# Patient Record
Sex: MALE | Race: WHITE | NOT HISPANIC OR LATINO | Employment: FULL TIME | URBAN - METROPOLITAN AREA
[De-identification: names, ages, dates, MRNs, and addresses within clinical notes are randomized per-mention and may not be internally consistent; named-entity substitution may affect disease eponyms.]

---
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
## 2024-03-26 ENCOUNTER — OFFICE VISIT (OUTPATIENT)
Dept: FAMILY MEDICINE | Facility: CLINIC | Age: 19
End: 2024-03-26
Payer: COMMERCIAL

## 2024-03-26 ENCOUNTER — LAB (OUTPATIENT)
Dept: LAB | Facility: CLINIC | Age: 19
End: 2024-03-26
Payer: COMMERCIAL

## 2024-03-26 VITALS
HEIGHT: 69 IN | SYSTOLIC BLOOD PRESSURE: 130 MMHG | OXYGEN SATURATION: 95 % | BODY MASS INDEX: 33.29 KG/M2 | TEMPERATURE: 97.3 F | RESPIRATION RATE: 16 BRPM | HEART RATE: 62 BPM | DIASTOLIC BLOOD PRESSURE: 68 MMHG

## 2024-03-26 DIAGNOSIS — Z13.0 SCREENING FOR DEFICIENCY ANEMIA: ICD-10-CM

## 2024-03-26 DIAGNOSIS — Z13.220 SCREENING FOR HYPERLIPIDEMIA: ICD-10-CM

## 2024-03-26 DIAGNOSIS — Z13.1 SCREENING FOR DIABETES MELLITUS: ICD-10-CM

## 2024-03-26 DIAGNOSIS — L40.4 ACUTE GUTTATE PSORIASIS: Primary | ICD-10-CM

## 2024-03-26 DIAGNOSIS — Z11.59 NEED FOR HEPATITIS C SCREENING TEST: ICD-10-CM

## 2024-03-26 DIAGNOSIS — L40.4 ACUTE GUTTATE PSORIASIS: ICD-10-CM

## 2024-03-26 DIAGNOSIS — R06.81 WITNESSED EPISODE OF APNEA: ICD-10-CM

## 2024-03-26 DIAGNOSIS — Z11.4 SCREENING FOR HIV (HUMAN IMMUNODEFICIENCY VIRUS): Primary | ICD-10-CM

## 2024-03-26 LAB
ANION GAP SERPL CALCULATED.3IONS-SCNC: 11 MMOL/L (ref 7–15)
BUN SERPL-MCNC: 13.3 MG/DL (ref 6–20)
CALCIUM SERPL-MCNC: 9.5 MG/DL (ref 8.6–10)
CHLORIDE SERPL-SCNC: 105 MMOL/L (ref 98–107)
CHOLEST SERPL-MCNC: 210 MG/DL
CREAT SERPL-MCNC: 1.22 MG/DL (ref 0.67–1.17)
DEPRECATED HCO3 PLAS-SCNC: 26 MMOL/L (ref 22–29)
DEPRECATED S PYO AG THROAT QL EIA: NEGATIVE
EGFRCR SERPLBLD CKD-EPI 2021: 87 ML/MIN/1.73M2
ERYTHROCYTE [DISTWIDTH] IN BLOOD BY AUTOMATED COUNT: 12.3 % (ref 10–15)
FASTING STATUS PATIENT QL REPORTED: NO
GLUCOSE SERPL-MCNC: 99 MG/DL (ref 70–99)
GROUP A STREP BY PCR: NOT DETECTED
HCT VFR BLD AUTO: 48.2 % (ref 40–53)
HCV AB SERPL QL IA: NONREACTIVE
HDLC SERPL-MCNC: 46 MG/DL
HGB BLD-MCNC: 16 G/DL (ref 13.3–17.7)
HIV 1+2 AB+HIV1 P24 AG SERPL QL IA: NONREACTIVE
LDLC SERPL CALC-MCNC: 151 MG/DL
MCH RBC QN AUTO: 30.7 PG (ref 26.5–33)
MCHC RBC AUTO-ENTMCNC: 33.2 G/DL (ref 31.5–36.5)
MCV RBC AUTO: 93 FL (ref 78–100)
NONHDLC SERPL-MCNC: 164 MG/DL
PLATELET # BLD AUTO: 264 10E3/UL (ref 150–450)
POTASSIUM SERPL-SCNC: 4.4 MMOL/L (ref 3.4–5.3)
RBC # BLD AUTO: 5.21 10E6/UL (ref 4.4–5.9)
SODIUM SERPL-SCNC: 142 MMOL/L (ref 135–145)
TRIGL SERPL-MCNC: 66 MG/DL
WBC # BLD AUTO: 7.1 10E3/UL (ref 4–11)

## 2024-03-26 PROCEDURE — 99213 OFFICE O/P EST LOW 20 MIN: CPT | Mod: 25 | Performed by: NURSE PRACTITIONER

## 2024-03-26 PROCEDURE — 99000 SPECIMEN HANDLING OFFICE-LAB: CPT

## 2024-03-26 PROCEDURE — 80048 BASIC METABOLIC PNL TOTAL CA: CPT

## 2024-03-26 PROCEDURE — 86215 DEOXYRIBONUCLEASE ANTIBODY: CPT | Mod: 90

## 2024-03-26 PROCEDURE — 36415 COLL VENOUS BLD VENIPUNCTURE: CPT

## 2024-03-26 PROCEDURE — 80061 LIPID PANEL: CPT

## 2024-03-26 PROCEDURE — 90471 IMMUNIZATION ADMIN: CPT | Performed by: NURSE PRACTITIONER

## 2024-03-26 PROCEDURE — 85027 COMPLETE CBC AUTOMATED: CPT

## 2024-03-26 PROCEDURE — 87651 STREP A DNA AMP PROBE: CPT | Performed by: NURSE PRACTITIONER

## 2024-03-26 PROCEDURE — 87389 HIV-1 AG W/HIV-1&-2 AB AG IA: CPT

## 2024-03-26 PROCEDURE — 90651 9VHPV VACCINE 2/3 DOSE IM: CPT | Performed by: NURSE PRACTITIONER

## 2024-03-26 PROCEDURE — 86803 HEPATITIS C AB TEST: CPT

## 2024-03-26 ASSESSMENT — PAIN SCALES - GENERAL: PAINLEVEL: NO PAIN (0)

## 2024-03-26 NOTE — PROGRESS NOTES
Assessment & Plan     Acute guttate psoriasis  Will rule out possible strep, rapid is negative, pcr is pending. Referral placed to dermatology for further evaluation / management recommendations. Discussed disease process with patient. Labs ordered today.   - Adult Dermatology  Referral; Future  - Streptococcus A Rapid Screen w/Reflex to PCR - Clinic Collect  - Basic metabolic panel  (Ca, Cl, CO2, Creat, Gluc, K, Na, BUN); Future  - CBC with platelets; Future  - DNASE-B antibody    Witnessed episode of apnea  Referral placed for further evaluation/ management.   - Adult Sleep Eval & Management  Referral; Future    Screening for hyperlipidemia  - Lipid panel reflex to direct LDL Fasting; Future    Screening for deficiency anemia  - CBC with platelets; Future    Screening for diabetes mellitus  - Basic metabolic panel  (Ca, Cl, CO2, Creat, Gluc, K, Na, BUN); Future          Subjective   Paul is a 18 year old, presenting for the following health issues:  Derm Problem        3/26/2024     9:14 AM   Additional Questions   Roomed by Elvia CLEMENT MA     History of Present Illness       Reason for visit:  Bumps on body, scalp sores and heavy flaking.  Symptom onset:  More than a month  Symptoms include:  Bumps on body.  Symptom intensity:  Moderate  Symptom progression:  Worsening  Had these symptoms before:  No  What makes it worse:  No.  What makes it better:  .shower.    He eats 2-3 servings of fruits and vegetables daily.He consumes 1 sweetened beverage(s) daily.He exercises with enough effort to increase his heart rate 60 or more minutes per day.  He exercises with enough effort to increase his heart rate 6 days per week.   He is taking medications regularly.     *Has photos. Has been worsening-  Sores currently all over head, rash does not itch or have pain.   Red spots in patches all over body. Has been going on for about 3 months. Large patches in armpits, and groin. Sporadic red dots all over  "body.  Tried eczema cream on face spots only. Dry, patchy and raised.   Once in a while they will open and bleed.     Significant other mentions apnea episodes as well when sleeping. He does snore.          Review of Systems  Constitutional, neuro, ENT, endocrine, pulmonary, cardiac, gastrointestinal, genitourinary, musculoskeletal, integument and psychiatric systems are negative, except as otherwise noted.      Objective    /68   Pulse 62   Temp 97.3  F (36.3  C) (Tympanic)   Resp 16   Ht 1.753 m (5' 9\")   SpO2 95%   BMI 33.29 kg/m    Body mass index is 33.29 kg/m .    Physical Exam   GENERAL: alert and no distress  HENT: both ears: canals dry and flaking with rash present, oropharynx clear, oral mucous membranes moist, tonsillar hypertrophy, and tonsillar erythema  NECK: no adenopathy, no asymmetry, masses, or scars  RESP: lungs clear to auscultation - no rales, rhonchi or wheezes  CV: regular rate and rhythm, normal S1 S2, no S3 or S4, no murmur, click or rub, no peripheral edema   SKIN: papular erythematous lesions guttate in pattern all over body. See photos below. Confluent raised flaking rash on scalp. Confluent in flexeral areas of axilla and groin.     Results for orders placed or performed in visit on 03/26/24   Streptococcus A Rapid Screen w/Reflex to PCR - Clinic Collect     Status: Normal    Specimen: Throat; Swab   Result Value Ref Range    Group A Strep antigen Negative Negative                                   Signed Electronically by: SYDNIE Decker CNP    "

## 2024-03-28 LAB — STREP DNASE B SER-ACNC: 180 U/ML

## 2024-04-13 ENCOUNTER — HEALTH MAINTENANCE LETTER (OUTPATIENT)
Age: 19
End: 2024-04-13

## 2024-04-19 ENCOUNTER — OFFICE VISIT (OUTPATIENT)
Dept: DERMATOLOGY | Facility: CLINIC | Age: 19
End: 2024-04-19
Payer: COMMERCIAL

## 2024-04-19 DIAGNOSIS — L40.4 ACUTE GUTTATE PSORIASIS: ICD-10-CM

## 2024-04-19 PROCEDURE — 87070 CULTURE OTHR SPECIMN AEROBIC: CPT | Performed by: PHYSICIAN ASSISTANT

## 2024-04-19 PROCEDURE — 99204 OFFICE O/P NEW MOD 45 MIN: CPT | Performed by: PHYSICIAN ASSISTANT

## 2024-04-19 PROCEDURE — 99000 SPECIMEN HANDLING OFFICE-LAB: CPT | Performed by: PATHOLOGY

## 2024-04-19 RX ORDER — TRIAMCINOLONE ACETONIDE 0.25 MG/G
CREAM TOPICAL 2 TIMES DAILY
Qty: 80 G | Refills: 1 | Status: SHIPPED | OUTPATIENT
Start: 2024-04-19 | End: 2024-07-10

## 2024-04-19 RX ORDER — CLOBETASOL PROPIONATE 0.5 MG/ML
SOLUTION TOPICAL 2 TIMES DAILY
Qty: 50 ML | Refills: 2 | Status: SHIPPED | OUTPATIENT
Start: 2024-04-19 | End: 2024-05-13

## 2024-04-19 ASSESSMENT — PAIN SCALES - GENERAL: PAINLEVEL: NO PAIN (0)

## 2024-04-19 NOTE — PROGRESS NOTES
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 Fhx PSO in mother and sister. No joint pain.   - Edu was given. Treatment options were discussed as well as their side effects.   - discussed that summer may help to improve his rash as sunlight can help, he does work out side - edu on sun protection  - rapid Strep A screen and Group A strep by PCR performed by PCP on 3/26/24 both negative/undetected  - Bacterial culture was performed on scalp today - will treat if co infected  - Start on triamcinolone 0.25% cream, Apply topically 2 times daily to axillae and groin - steroid edu given  - Start on clobetasol solution 0.05%,  Apply topically 2 times daily to scalp - steroid edu given  Discussed using phototherapy(nbUVB) to treat skin condition. Discussed risks and benefits of treatment including but not limited to burn, dyspigmentation, and theoretical increased risk of skin malignancy. Phototherapy consent obtained in verbal and written format. Total skin examination completed today. Medication list reviewed and no photosensitizing medication was noted.   Will initiate treatment at  100mJ three times weekly for 4 weeks and recheck with provider in 4 weeks.   Maximum dose:  Type I, II = 2000 mJ/cm2    Procedures Performed:     Follow-up: 6 month(s) in-person, or earlier for new or changing lesions    Staff and scribe:    Scribe Disclosure:   I, SONG RICE, am serving as a scribe; to document services personally performed by Aby Wilson PA-C -based on data collection and the provider's statements to me.     Provider  Disclosure:  I agree with above History, Review of Systems, Physical exam and Plan.  I have reviewed the content of the documentation and have edited it as needed. I have personally performed the services documented here and the documentation accurately represents those services and the decisions I have made.      Electronically signed by:    All risks, benefits and alternatives were discussed with patient.  Patient is in agreement and understands the assessment and plan.  All questions were answered.    Aby Wilson PA-C, MPAS  [facility] [place]: Phone: [phone], Fax: [phone]  [facility]: Phone: [phone],  Fax: [phone]  [facility]: Phone: [phone], Fax: [phone]  ____________________________________________    CC: Derm Problem ( scabbing and wounds on head and body since December)      Reviewed patients past medical history and pertinent chart review prior to patient's visit today.     HPI:  Mr. John Terrell is a 18 year old male who presents today as a return patient for evaluation. Today patient reported scabbing and wounds on his head and body. Has had for about 5-6 months. Referred by PCP who thought this was guttate PSO. Patient has been picking at scalp. Mom and sister with PSO. No joint pain.      Patient is otherwise feeling well, without additional concerns.    Labs:  Labs performed on 3/26/24   Group A strep by PCR  Not Detected   Reviewed labs performed on 3/26/24: BMP, CBC, lipid ( Elevated)   DNAse B Antibody  <=259 U/mL 180      HIV Antigen Antibody Combo  Nonreactive Nonreactive      Hepatitis C Antibody  Nonreactive Nonreactive     Physical Exam:  Vitals: There were no vitals taken for this visit.  SKIN: Total skin excluding the undergarment areas was performed. The exam included the head/face, neck, both arms, chest, back, abdomen, both legs, digits and/or nails.     - Erythematous scaly papules ranges in size from 2 m - 1 cm on his torsos arms and legs.   - Diffused scaling and plaques throughout his scalp  - Scaly thickened plaques on the bilateral axilla  - Scaly erythematous plaques on the bilateral inguinal folds and scrotum  - No other lesions of concern on areas examined.     Medications:  Current Outpatient Medications   Medication Sig Dispense Refill    cetirizine (ZYRTEC) 10 MG tablet Take 10 mg by mouth daily (Patient not taking: Reported on 3/26/2024)      lidocaine (XYLOCAINE) 2 % external gel Place into the urethra 3 times daily as needed for moderate pain (Patient not taking: Reported on 10/12/2021) 30 mL 0    methylphenidate (CONCERTA) 36 MG CR tablet Take 1 tablet (36 mg) by mouth daily (Patient not taking: Reported on 3/26/2024) 30 tablet 0    methylphenidate (CONCERTA) 36 MG CR tablet Take 1 tablet (36 mg) by mouth daily (Patient not taking: Reported on 3/26/2024) 30 tablet 0    methylphenidate (CONCERTA) 36 MG CR tablet Take 1 tablet (36 mg) by mouth daily (Patient not taking: Reported on 3/26/2024) 30 tablet 0    TYLENOL CHILDRENS 80 MG OR CHEW prn (Patient not taking: No sig reported)  0     No current facility-administered medications for this visit.      Past Medical/Surgical History:   Patient Active Problem List   Diagnosis    Snoring    ADHD (attention deficit hyperactivity disorder)    Long-term current use of stimulant     History reviewed. No pertinent past medical history.

## 2024-04-19 NOTE — NURSING NOTE
Dermatology Rooming Note    John Terrell's goals for this visit include:   Chief Complaint   Patient presents with    Derm Problem      scabbing and wounds on head and body since December     Ashley Duval, EMT

## 2024-04-19 NOTE — LETTER
4/19/2024       RE: John Terrell  [address]  [place] [address]     Dear Colleague,    Thank you for referring your patient, John Terrell, to the [facility] [place] [facility]. Please see a copy of my visit note below.    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 Fhx PSO in mother and sister. No joint pain.   - Edu was given. Treatment options were discussed as well as their side effects.   - discussed that summer may help to improve his rash as sunlight can help, he does work out side - edu on sun protection  - rapid Strep A screen and Group A strep by PCR performed by PCP on 3/26/24 both negative/undetected  - Bacterial culture was performed on scalp today - will treat if co infected  - Start on triamcinolone 0.25% cream, Apply topically 2 times daily to axillae and groin - steroid edu given  - Start on clobetasol solution 0.05%,  Apply topically 2 times daily to scalp - steroid edu given  Discussed using phototherapy(nbUVB) to treat skin condition. Discussed risks and benefits of treatment including but not limited to burn, dyspigmentation, and theoretical increased risk of skin malignancy. Phototherapy consent obtained in verbal and written format. Total skin examination completed today. Medication list reviewed and no photosensitizing medication was noted.   Will initiate treatment at  100mJ three times weekly for 4 weeks and recheck with provider in 4 weeks.   Maximum dose:  Type I, II = 2000 mJ/cm2    Procedures Performed:      Follow-up: 6 month(s) in-person, or earlier for new or changing lesions    Staff and scribe:    Scribe Disclosure:   I, SONG RICE, am serving as a scribe; to document services personally performed by Aby Wilson PA-C -based on data collection and the provider's statements to me.     Provider Disclosure:  I agree with above History, Review of Systems, Physical exam and Plan.  I have reviewed the content of the documentation and have edited it as needed. I have personally performed the services documented here and the documentation accurately represents those services and the decisions I have made.      Electronically signed by:    All risks, benefits and alternatives were discussed with patient.  Patient is in agreement and understands the assessment and plan.  All questions were answered.    Aby Wilson PA-C, RUSTS  [facility] [place]: Phone: [phone], Fax: [phone]  [facility]: Phone: [phone],  Fax: [phone]  [facility]: Phone: [phone], Fax: [phone]  ____________________________________________    CC: Derm Problem ( scabbing and wounds on head and body since December)      Reviewed patients past medical history and pertinent chart review prior to patient's visit today.     HPI:  Mr. John Terrell is a 18 year old male who presents today as a return patient for evaluation. Today patient reported scabbing and wounds on his head and body. Has had for about 5-6 months. Referred by PCP who thought this was guttate PSO. Patient has been picking at scalp. Mom and sister with PSO. No joint pain.      Patient is otherwise feeling well, without additional concerns.    Labs:  Labs performed on 3/26/24   Group A strep by PCR  Not Detected   Reviewed labs performed on 3/26/24: BMP, CBC, lipid ( Elevated)   DNAse B Antibody  <=259 U/mL 180      HIV Antigen Antibody  Combo  Nonreactive Nonreactive      Hepatitis C Antibody  Nonreactive Nonreactive     Physical Exam:  Vitals: There were no vitals taken for this visit.  SKIN: Total skin excluding the undergarment areas was performed. The exam included the head/face, neck, both arms, chest, back, abdomen, both legs, digits and/or nails.    - Erythematous scaly papules ranges in size from 2 m - 1 cm on his torsos arms and legs.   - Diffused scaling and plaques throughout his scalp  - Scaly thickened plaques on the bilateral axilla  - Scaly erythematous plaques on the bilateral inguinal folds and scrotum  - No other lesions of concern on areas examined.     Medications:  Current Outpatient Medications   Medication Sig Dispense Refill    cetirizine (ZYRTEC) 10 MG tablet Take 10 mg by mouth daily (Patient not taking: Reported on 3/26/2024)      lidocaine (XYLOCAINE) 2 % external gel Place into the urethra 3 times daily as needed for moderate pain (Patient not taking: Reported on 10/12/2021) 30 mL 0    methylphenidate (CONCERTA) 36 MG CR tablet Take 1 tablet (36 mg) by mouth daily (Patient not taking: Reported on 3/26/2024) 30 tablet 0    methylphenidate (CONCERTA) 36 MG CR tablet Take 1 tablet (36 mg) by mouth daily (Patient not taking: Reported on 3/26/2024) 30 tablet 0    methylphenidate (CONCERTA) 36 MG CR tablet Take 1 tablet (36 mg) by mouth daily (Patient not taking: Reported on 3/26/2024) 30 tablet 0    TYLENOL CHILDRENS 80 MG OR CHEW prn (Patient not taking: No sig reported)  0     No current facility-administered medications for this visit.      Past Medical/Surgical History:   Patient Active Problem List   Diagnosis    Snoring    ADHD (attention deficit hyperactivity disorder)    Long-term current use of stimulant     History reviewed. No pertinent past medical history.

## 2024-04-21 LAB — BACTERIA SKIN AEROBE CULT: NORMAL

## 2024-04-22 ENCOUNTER — TELEPHONE (OUTPATIENT)
Dept: FAMILY MEDICINE | Facility: CLINIC | Age: 19
End: 2024-04-22

## 2024-04-22 NOTE — TELEPHONE ENCOUNTER
Called. S/W patient. Advised patient light treatment is 2-3x a week for 12x weeks. Patient will figure out his schedule and call back.    Thank you  Christianne Alberto

## 2024-04-25 DIAGNOSIS — L40.4 ACUTE GUTTATE PSORIASIS: Primary | ICD-10-CM

## 2024-04-25 RX ORDER — CALCIPOTRIENE 50 UG/G
CREAM TOPICAL 2 TIMES DAILY
Qty: 456 G | Refills: 4 | Status: SHIPPED | OUTPATIENT
Start: 2024-04-25

## 2024-05-13 ENCOUNTER — MYC REFILL (OUTPATIENT)
Dept: DERMATOLOGY | Facility: CLINIC | Age: 19
End: 2024-05-13
Payer: COMMERCIAL

## 2024-05-13 DIAGNOSIS — L40.4 ACUTE GUTTATE PSORIASIS: ICD-10-CM

## 2024-05-14 RX ORDER — CLOBETASOL PROPIONATE 0.5 MG/ML
SOLUTION TOPICAL 2 TIMES DAILY
Qty: 50 ML | Refills: 2 | Status: SHIPPED | OUTPATIENT
Start: 2024-05-14

## 2024-05-14 NOTE — TELEPHONE ENCOUNTER
Last seen 4/19/24. Will resend and ensure follow up in 6 months.     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 Fhx PSO in mother and sister. No joint pain.   - Edu was given. Treatment options were discussed as well as their side effects.   - discussed that summer may help to improve his rash as sunlight can help, he does work out side - edu on sun protection  - rapid Strep A screen and Group A strep by PCR performed by PCP on 3/26/24 both negative/undetected  - Bacterial culture was performed on scalp today - will treat if co infected  - Start on triamcinolone 0.25% cream, Apply topically 2 times daily to axillae and groin - steroid edu given  - Start on clobetasol solution 0.05%,  Apply topically 2 times daily to scalp - steroid edu given

## 2024-07-10 ENCOUNTER — MYC REFILL (OUTPATIENT)
Dept: DERMATOLOGY | Facility: CLINIC | Age: 19
End: 2024-07-10
Payer: COMMERCIAL

## 2024-07-10 DIAGNOSIS — L40.4 ACUTE GUTTATE PSORIASIS: ICD-10-CM

## 2024-07-11 RX ORDER — TRIAMCINOLONE ACETONIDE 0.25 MG/G
CREAM TOPICAL 2 TIMES DAILY
Qty: 80 G | Refills: 1 | Status: SHIPPED | OUTPATIENT
Start: 2024-07-11

## 2024-07-11 NOTE — TELEPHONE ENCOUNTER
triamcinolone (KENALOG) 0.025 % cream   80 g 1 4/19/2024       Last Office Visit : 4- ---Follow-up: 6 month(s) in-person, or earlier for new or changing lesions   Future Office visit:  none      Process 4

## 2025-04-19 ENCOUNTER — HEALTH MAINTENANCE LETTER (OUTPATIENT)
Age: 20
End: 2025-04-19